# Patient Record
Sex: FEMALE | NOT HISPANIC OR LATINO | Employment: FULL TIME | URBAN - METROPOLITAN AREA
[De-identification: names, ages, dates, MRNs, and addresses within clinical notes are randomized per-mention and may not be internally consistent; named-entity substitution may affect disease eponyms.]

---

## 2019-01-17 ENCOUNTER — OFFICE VISIT (OUTPATIENT)
Dept: MEDICAL GROUP | Facility: PHYSICIAN GROUP | Age: 49
End: 2019-01-17
Payer: COMMERCIAL

## 2019-01-17 ENCOUNTER — APPOINTMENT (OUTPATIENT)
Dept: RADIOLOGY | Facility: IMAGING CENTER | Age: 49
End: 2019-01-17
Attending: FAMILY MEDICINE
Payer: COMMERCIAL

## 2019-01-17 ENCOUNTER — TELEPHONE (OUTPATIENT)
Dept: SCHEDULING | Facility: IMAGING CENTER | Age: 49
End: 2019-01-17

## 2019-01-17 VITALS
HEIGHT: 62 IN | HEART RATE: 60 BPM | TEMPERATURE: 98.4 F | BODY MASS INDEX: 29.63 KG/M2 | RESPIRATION RATE: 18 BRPM | WEIGHT: 161 LBS | SYSTOLIC BLOOD PRESSURE: 130 MMHG | OXYGEN SATURATION: 96 % | DIASTOLIC BLOOD PRESSURE: 82 MMHG

## 2019-01-17 DIAGNOSIS — M54.41 ACUTE RIGHT-SIDED LOW BACK PAIN WITH RIGHT-SIDED SCIATICA: ICD-10-CM

## 2019-01-17 DIAGNOSIS — Z13.6 SCREENING FOR CARDIOVASCULAR CONDITION: ICD-10-CM

## 2019-01-17 DIAGNOSIS — Z13.1 SCREENING FOR DIABETES MELLITUS: ICD-10-CM

## 2019-01-17 DIAGNOSIS — Z12.31 ENCOUNTER FOR SCREENING MAMMOGRAM FOR BREAST CANCER: ICD-10-CM

## 2019-01-17 PROCEDURE — 72100 X-RAY EXAM L-S SPINE 2/3 VWS: CPT | Mod: TC | Performed by: FAMILY MEDICINE

## 2019-01-17 PROCEDURE — 99203 OFFICE O/P NEW LOW 30 MIN: CPT | Performed by: FAMILY MEDICINE

## 2019-01-17 ASSESSMENT — PATIENT HEALTH QUESTIONNAIRE - PHQ9: CLINICAL INTERPRETATION OF PHQ2 SCORE: 0

## 2019-01-17 NOTE — PROGRESS NOTES
"cc: lower back pain.       Subjective:     Yoana Snow is a 48 y.o. female presenting for the following:     For the last 6 weeks patient has had some right sided lower back pain that is radiating down her posterior thigh and leg. No injuries or falls. No weakness or numbness. No changes to bladder/bowel.      She has been trying to stay active but her back pain has flared when she tried to go back to exercise. The pain is improved with massage and gentle stretching exercises.     Review of systems:  All others reviewed and are negative.     No current outpatient prescriptions on file.    Allergies, past medical history, past surgical history, family history, social history reviewed and updated    Objective:     Vitals: /82 (BP Location: Left arm, Patient Position: Sitting, BP Cuff Size: Adult)   Pulse 60   Temp 36.9 °C (98.4 °F) (Temporal)   Resp 18   Ht 1.575 m (5' 2\")   Wt 73 kg (161 lb)   SpO2 96%   BMI 29.45 kg/m²   General: Alert, pleasant, NAD  HEENT: Normocephalic.   EOMI, no icterus or pallor.  Conjunctivae and lids normal. External ears normal. mucous membranes moist.    Neck supple.  No thyromegaly or masses palpated. No cervical or supraclavicular lymphadenopathy.  Heart: Regular rate and rhythm.  S1 and S2 normal.  No murmurs appreciated.  Respiratory: Normal respiratory effort.  Clear to auscultation bilaterally.  Abdomen: Non-distended, soft  Skin: Warm, dry  Musculoskeletal: Gait is normal.  Moves all extremities well. Patellar DTRs 2+ bilaterally. Back without deformity or tenderness.   Extremities: No leg edema.    Neurological:  gait is normal, CN2-12 grossly intact  Psych:  Affect is normal, judgement is good, grooming is appropriate.    Assessment/Plan:     Yoana was seen today for annual exam.    Diagnoses and all orders for this visit:    Acute right-sided low back pain with right-sided sciatica: no red flag symptoms. Will treat with PT and patient to RTC if symptoms not " improving in 4-6 weeks. PRN NSAIDS and suggest staying active.   -     DX-LUMBAR SPINE-2 OR 3 VIEWS; Future  -     REFERRAL TO PHYSICAL THERAPY Reason for Therapy: Eval/Treat/Report    Screening for cardiovascular condition  -     Lipid Profile; Future    Screening for diabetes mellitus  -     BASIC METABOLIC PANEL; Future    Encounter for screening mammogram for breast cancer  No breast pain or masses, breast skin changes, or nipple discharge.   -     MA-SCREENING MAMMO BILAT W/TOMOSYNTHESIS W/CAD; Future    Return if symptoms worsen or fail to improve.

## 2019-01-29 ENCOUNTER — HOSPITAL ENCOUNTER (OUTPATIENT)
Dept: LAB | Facility: MEDICAL CENTER | Age: 49
End: 2019-01-29
Attending: OBSTETRICS & GYNECOLOGY
Payer: COMMERCIAL

## 2019-01-29 LAB
25(OH)D3 SERPL-MCNC: 19 NG/ML (ref 30–100)
ALBUMIN SERPL BCP-MCNC: 4.6 G/DL (ref 3.2–4.9)
ALBUMIN/GLOB SERPL: 1.8 G/DL
ALP SERPL-CCNC: 44 U/L (ref 30–99)
ALT SERPL-CCNC: 12 U/L (ref 2–50)
ANION GAP SERPL CALC-SCNC: 4 MMOL/L (ref 0–11.9)
AST SERPL-CCNC: 16 U/L (ref 12–45)
BASOPHILS # BLD AUTO: 0.6 % (ref 0–1.8)
BASOPHILS # BLD: 0.03 K/UL (ref 0–0.12)
BILIRUB SERPL-MCNC: 0.5 MG/DL (ref 0.1–1.5)
BUN SERPL-MCNC: 11 MG/DL (ref 8–22)
CALCIUM SERPL-MCNC: 9.8 MG/DL (ref 8.5–10.5)
CHLORIDE SERPL-SCNC: 105 MMOL/L (ref 96–112)
CHOLEST SERPL-MCNC: 143 MG/DL (ref 100–199)
CO2 SERPL-SCNC: 26 MMOL/L (ref 20–33)
CREAT SERPL-MCNC: 0.71 MG/DL (ref 0.5–1.4)
EOSINOPHIL # BLD AUTO: 0.1 K/UL (ref 0–0.51)
EOSINOPHIL NFR BLD: 2 % (ref 0–6.9)
ERYTHROCYTE [DISTWIDTH] IN BLOOD BY AUTOMATED COUNT: 43.4 FL (ref 35.9–50)
GLOBULIN SER CALC-MCNC: 2.6 G/DL (ref 1.9–3.5)
GLUCOSE SERPL-MCNC: 87 MG/DL (ref 65–99)
HCT VFR BLD AUTO: 45.7 % (ref 37–47)
HDLC SERPL-MCNC: 52 MG/DL
HGB BLD-MCNC: 14.9 G/DL (ref 12–16)
IMM GRANULOCYTES # BLD AUTO: 0.01 K/UL (ref 0–0.11)
IMM GRANULOCYTES NFR BLD AUTO: 0.2 % (ref 0–0.9)
LDLC SERPL CALC-MCNC: 82 MG/DL
LYMPHOCYTES # BLD AUTO: 1.86 K/UL (ref 1–4.8)
LYMPHOCYTES NFR BLD: 36.5 % (ref 22–41)
MCH RBC QN AUTO: 29.3 PG (ref 27–33)
MCHC RBC AUTO-ENTMCNC: 32.6 G/DL (ref 33.6–35)
MCV RBC AUTO: 89.8 FL (ref 81.4–97.8)
MONOCYTES # BLD AUTO: 0.32 K/UL (ref 0–0.85)
MONOCYTES NFR BLD AUTO: 6.3 % (ref 0–13.4)
NEUTROPHILS # BLD AUTO: 2.78 K/UL (ref 2–7.15)
NEUTROPHILS NFR BLD: 54.4 % (ref 44–72)
NRBC # BLD AUTO: 0 K/UL
NRBC BLD-RTO: 0 /100 WBC
PLATELET # BLD AUTO: 317 K/UL (ref 164–446)
PMV BLD AUTO: 9.7 FL (ref 9–12.9)
POTASSIUM SERPL-SCNC: 3.9 MMOL/L (ref 3.6–5.5)
PROT SERPL-MCNC: 7.2 G/DL (ref 6–8.2)
RBC # BLD AUTO: 5.09 M/UL (ref 4.2–5.4)
SODIUM SERPL-SCNC: 135 MMOL/L (ref 135–145)
TRIGL SERPL-MCNC: 44 MG/DL (ref 0–149)
WBC # BLD AUTO: 5.1 K/UL (ref 4.8–10.8)

## 2019-01-29 PROCEDURE — 80061 LIPID PANEL: CPT

## 2019-01-29 PROCEDURE — 82306 VITAMIN D 25 HYDROXY: CPT

## 2019-01-29 PROCEDURE — 85025 COMPLETE CBC W/AUTO DIFF WBC: CPT

## 2019-01-29 PROCEDURE — 80053 COMPREHEN METABOLIC PANEL: CPT

## 2019-02-14 ENCOUNTER — PHYSICAL THERAPY (OUTPATIENT)
Dept: PHYSICAL THERAPY | Facility: REHABILITATION | Age: 49
End: 2019-02-14
Attending: FAMILY MEDICINE
Payer: COMMERCIAL

## 2019-02-14 DIAGNOSIS — M54.41 ACUTE RIGHT-SIDED LOW BACK PAIN WITH RIGHT-SIDED SCIATICA: ICD-10-CM

## 2019-02-14 PROCEDURE — 97014 ELECTRIC STIMULATION THERAPY: CPT

## 2019-02-14 PROCEDURE — 97012 MECHANICAL TRACTION THERAPY: CPT

## 2019-02-14 PROCEDURE — 97161 PT EVAL LOW COMPLEX 20 MIN: CPT

## 2019-02-14 SDOH — ECONOMIC STABILITY: GENERAL: QUALITY OF LIFE: EXCELLENT

## 2019-02-14 ASSESSMENT — ENCOUNTER SYMPTOMS
QUALITY: SHARP
EXACERBATED BY: PROLONGED SITTING
EXACERBATED BY: STAIR CLIMBING
PAIN TIMING: IN THE EVENING
EXACERBATED BY: SNEEZING
QUALITY: THROBBING
PAIN SCALE: 1
EXACERBATED BY: BENDING
ALLEVIATING FACTORS: CHANGE IN POSITION
PAIN SCALE AT HIGHEST: 5
EXACERBATED BY: COUGHING
QUALITY: TINGLING
PAIN SCALE AT LOWEST: 0
ALLEVIATING FACTORS: ICE
EXACERBATED BY: SITTING
ALLEVIATING FACTORS: MASSAGE

## 2019-02-14 ASSESSMENT — ACTIVITIES OF DAILY LIVING (ADL): POOR_BALANCE: 1

## 2019-02-14 NOTE — OP THERAPY EVALUATION
Outpatient Physical Therapy  INITIAL EVALUATION    [facility] Physical Therapy [address]  [address]  [place] [address]  Phone:  [phone]  Fax:  [phone]    Date of Evaluation: 2019    Patient: Yoana Snow  YOB: 1970  MRN: [id number]     Referring Provider: Britany Cohen M.D.  [address] [facility] [address]  [place] [address]   Referring Diagnosis Acute right-sided low back pain with right-sided sciatica [M54.41]     Time Calculation  Start time: 1000  Stop time: 1115 Time Calculation (min): 75 minutes     Physical Therapy Occurrence Codes    Date of onset of impairment:  19   Date physical therapy care plan established or reviewed:  19   Date physical therapy treatment started:  19          Chief Complaint: Back Problem    Visit Diagnoses     ICD-10-CM   1. Acute right-sided low back pain with right-sided sciatica M54.41         Subjective:   History of Present Illness:     Mechanism of injury:  Patient reports onset of R side low back pain in late November. Initially only in the low back, and about 3 weeks later started radiating down RLE posteriorly to ankle. Since then muscles in low back and posterior RLE have felt quite tense. About a week and a half ago, began having and numbness and tingling down posterior RLE in the same pattern.  Pain level has reduced as the numbness/tingling began.  No fevers. Denies changes to bowel/bladder function.      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, sitting primarily  Sleep disturbance:  Not disrupted (pain if she wakes up to shift positions, but falls right back to sleep)  Pain:     Current pain ratin    At best pain ratin (when she ices)    At worst pain ratin    Location:  Low back R>L and posterior RLE to ankle    Quality:  Tingling, sharp and throbbing    Pain timing:  In the evening (pain is lowest in the morning upon waking)     "Relieving factors:  Ice, massage and change in position (\"sitting up straight\")    Aggravating factors:  Stair climbing, sitting, bending, prolonged sitting, sneezing and coughing (putting on shoes)    Progression:  Improving  Social Support:     Lives in:  One-story house    Lives with:  Young children and spouse  Diagnostic Tests:     X-ray: normal      Diagnostic Tests Comments:  Lumbar spine x-ray 1/17/2019  Impression       1.  Minimal upper lumbar scoliosis versus spasm.    2.  Otherwise negative lumbar spine series.      Treatments:     Previous treatment:  Chiropractic and massage  Activities of Daily Living:     Patient reported ADL status: Prior to onset, was sporadically exercising  Patient Goals:     Patient goals for therapy:  Return to sport/leisure activities, increased motion and independence with ADLs/IADLs    Other patient goals:  To be able to put her shoes on and get rid of the pain in her leg      No past medical history on file.  No past surgical history on file.  Social History   Substance Use Topics   • Smoking status: Never Smoker   • Smokeless tobacco: Never Used   • Alcohol use No     Family and Occupational History     Social History   • Marital status: Single     Spouse name: N/A   • Number of children: N/A   • Years of education: N/A       Objective     Neurological Testing     Reflexes   Left   Patellar (L4): normal (2+)  Achilles (S1): normal (2+)  Ankle clonus reflex: negative  Babinski sign: negative    Right   Patellar (L4): normal (2+)  Achilles (S1): normal (2+)  Ankle clonus reflex: negative  Babinski sign: negative    Myotome testing   Lumbar (left)   All left lumbar myotomes within normal limits    Lumbar (right)   All right lumbar myotomes within normal limits    Dermatome testing   Lumbar (left)   All left lumbar dermatomes intact    Lumbar (right)   All right lumbar dermatomes intact    Palpation     Additional Palpation Details  No tenderness to palpation lumbar " "paraspinals, QL, glute med, piriformis bilaterally    Tenderness     Left Hip   No tenderness in the sacroiliac joint.     Right Hip   No tenderness in the sacroiliac joint.     Active Range of Motion     Lumbar   Flexion: decreased (Fingers to knees. At baseline could put hands on floor per patient. LBP and calf pain R.)  Extension: within functional limits (mild calf pain R)  Left lateral flexion: within functional limits (\"pull\" in R glute region)  Right lateral flexion: decreased (slightly decreased. asymptomatic.)  Left rotation: within functional limits  Right rotation: within functional limits    Joint Play   Spine     Unilateral PA Glide (left)        T12: WFL       L1: WFL       L2: WFL       L3: WFL       L4: WFL       L5: WFL       S1: WFL        Tests       Lumbar spine (right)     Negative slump.     Left Hip   Negative SI compression.   SLR: Negative.     Right Hip   Positive SI compression.   SLR: Negative.     Additional Tests Details  Slump and SLR R negative for radiating symptoms but positive for neural tension and hamstring/gastroc muscle spasm        Therapeutic Exercises (CPT 54689):     1. Neuromeningeal glides in supine or standing, patient reported relief of symptoms    2. Manual traction, 3 min, patient reported relief of symptoms    Therapeutic Treatments and Modalities:     1. Mechanical Traction (CPT 22530), Lumbar 70#/40# 60/20 w/mhp 15'    Time-based treatments/modalities:  Therapeutic exercise minutes (CPT 43241): 8 minutes       Assessment, Response and Plan:   Impairments: abnormal gait, abnormal muscle firing, abnormal or restricted ROM, impaired balance, lacks appropriate home exercise program, limited ADL's and pain with function    Assessment details:  48 year old female with chief complaint of tightness and n/t in posterior R LE following 2-3 month onset of R side LBP with posterior RLE radiating pain.  Exam findings show impairments of ROM, gait, and function as above, " secondary to probable lumbar radiculopathy. The patient will benefit from skilled PT to address associated impairments and functional limitations, to return her to baseline and improve QOL.   Barriers to therapy:  None  Prognosis: good    Goals:   Short Term Goals:   Lumbar AROM without provocation of symptoms  No symptom provocation with ADLs/IADLS  Short term goal time span:  2-4 weeks      Long Term Goals:    Remy Jaren Score = 0  Patient is independent with HEP  No symptoms with slump or SLR R  Long term goal time span:  4-6 weeks    Plan:   Therapy options:  Physical therapy treatment to continue  Planned therapy interventions:  E Stim Unattended (CPT 98912), Manual Therapy (CPT 35824), Neuromuscular Re-education (CPT 21425), Mechanical Traction (CPT 01807), Therapeutic Activities (CPT 52553) and Therapeutic Exercise (CPT 17305)  Frequency:  2x week  Duration in weeks:  6  Discussed with:  Patient      Functional Limitations and Severity Modifiers  Remy Jaren Low Back Pain and Disability Score: 20.83     Referring provider co-signature:  I have reviewed this plan of care and my co-signature certifies the need for services.  Certification Dates:   From 2/14/2019    To 3/29/2019    Physician Signature: ________________________________ Date: ______________

## 2019-02-18 ENCOUNTER — APPOINTMENT (OUTPATIENT)
Dept: PHYSICAL THERAPY | Facility: REHABILITATION | Age: 49
End: 2019-02-18
Payer: COMMERCIAL

## 2019-02-20 ENCOUNTER — PHYSICAL THERAPY (OUTPATIENT)
Dept: PHYSICAL THERAPY | Facility: REHABILITATION | Age: 49
End: 2019-02-20
Attending: FAMILY MEDICINE
Payer: COMMERCIAL

## 2019-02-20 DIAGNOSIS — M54.41 ACUTE RIGHT-SIDED LOW BACK PAIN WITH RIGHT-SIDED SCIATICA: ICD-10-CM

## 2019-02-20 PROCEDURE — 97110 THERAPEUTIC EXERCISES: CPT

## 2019-02-20 PROCEDURE — 97012 MECHANICAL TRACTION THERAPY: CPT

## 2019-02-20 NOTE — OP THERAPY DAILY TREATMENT
Outpatient Physical Therapy  DAILY TREATMENT     [facility] Physical [address].  [address]  [place] [address]  Phone:  [phone]  Fax:  [phone]    Date: 02/20/2019    Patient: Yoana Snow  YOB: 1970  MRN: [id number]     Time Calculation  Start time: 1100  Stop time: 1145 Time Calculation (min): 45 minutes     Chief Complaint: Back Problem    Visit #: 2    SUBJECTIVE:  Was symptom free for three days after initial evaluation. But, then felt a tweak in RLB when getting out of bed and radiating numbness returned. Not as bad as previously. Provoked with bending over primarily.     OBJECTIVE:  Current objective measures:           Therapeutic Exercises (CPT 28842):     1. Nustepper , L3 4 min    2. ADIM with heel walk, knee fall out    3. Bilateral UE extension with pink band, 15 x 2, HEP    4. Bridging, 8 x 2, HEP    5. Neuromeningeal glides      Therapeutic Exercise Summary: Access Code: [email or web address]   URL: [email or web address]   Date: 02/20/2019   Prepared by: Yun Ku      Exercises  · Supine Bridge - 8 reps - 2 sets - 1x daily - 7x weekly  · Supine Transversus Abdominis Bracing - Hands on Thighs - 15 reps - 2 sets - 1x daily - 7x weekly    Neuromeningeal glides     Therapeutic Treatments and Modalities:     1. Mechanical Traction (CPT 60305), Lumbar 70#/40# 60/20 w/mhp 15'    Time-based treatments/modalities:  Therapeutic exercise minutes (CPT 47235): 28 minutes           ASSESSMENT:   Response to treatment: Difficulty with ADIM improved with band exercise. No symptom provocation with today's treatment, relief with traction.     PLAN/RECOMMENDATIONS:   Plan for treatment: therapy treatment to continue next visit.  Planned interventions for next visit: continue with current treatment.

## 2019-02-21 ENCOUNTER — APPOINTMENT (OUTPATIENT)
Dept: PHYSICAL THERAPY | Facility: REHABILITATION | Age: 49
End: 2019-02-21
Attending: FAMILY MEDICINE
Payer: COMMERCIAL

## 2019-02-25 ENCOUNTER — APPOINTMENT (OUTPATIENT)
Dept: PHYSICAL THERAPY | Facility: REHABILITATION | Age: 49
End: 2019-02-25
Payer: COMMERCIAL

## 2019-02-25 ENCOUNTER — PHYSICAL THERAPY (OUTPATIENT)
Dept: PHYSICAL THERAPY | Facility: REHABILITATION | Age: 49
End: 2019-02-25
Attending: FAMILY MEDICINE
Payer: COMMERCIAL

## 2019-02-25 DIAGNOSIS — M54.41 ACUTE RIGHT-SIDED LOW BACK PAIN WITH RIGHT-SIDED SCIATICA: ICD-10-CM

## 2019-02-25 PROCEDURE — 97012 MECHANICAL TRACTION THERAPY: CPT

## 2019-02-25 PROCEDURE — 97110 THERAPEUTIC EXERCISES: CPT

## 2019-02-25 NOTE — OP THERAPY DAILY TREATMENT
Outpatient Physical Therapy  DAILY TREATMENT     [facility] Physical [address]  [address]  [place] [address]  Phone:  [phone]  Fax:  [phone]    Date: 02/25/2019    Patient: Yoana Snow  YOB: 1970  MRN: [id number]     Time Calculation  Start time: 0945 (the patient arrived 15 min late)  Stop time: 1015 Time Calculation (min): 30 minutes     Chief Complaint: Back Problem    Visit #: 3    SUBJECTIVE:  Having less trouble putting on shoes, climbing stairs.     OBJECTIVE:  Current objective measures:           Therapeutic Exercises (CPT 51001):     1. Neuromeningeal glides L, 1 min x 2    2. Supine 90/90 with tap downs/bicycle, 30 sec. x 2    3. Cat/camel, 10 reps x 2    4. Quadruped unilateral hip extension to neutral, 3 with 15 sec. hold ea. side x 1      Therapeutic Exercise Summary: HEP - Access Code: [email or web address]   URL: [email or web address]   Date: 02/25/2019   Prepared by: Yun Ku      Exercises  · Supine Bridge - 8 reps - 2 sets - 1x daily - 7x weekly  · Quadruped Alternating Leg Extensions - 10 reps - 3 sets - 1x daily - 7x weekly  · Supine Core Bicycle - 10 reps - 3 sets - 1x daily - 7x weekly  · Cat-Camel - 10 reps - 3 sets - 1x daily - 7x weekly        Neuromeningeal glides     Therapeutic Treatments and Modalities:     1. Mechanical Traction (CPT 54118), Lumbar 80#/40# 60/20 w/mhp 15'    Time-based treatments/modalities:  Therapeutic exercise minutes (CPT 64210): 15 minutes         ASSESSMENT:   Response to treatment: Steady reduction in frequency/irritability of symptoms. Difficulty engaging L glute appropriately in quadruped exercise, improved with cueing.     PLAN/RECOMMENDATIONS:   Plan for treatment: therapy treatment to continue next visit.  Planned interventions for next visit: continue with current treatment. Follow up in one week. Squats, BOSU, assess L glute strength.

## 2019-02-26 ENCOUNTER — APPOINTMENT (OUTPATIENT)
Dept: PHYSICAL THERAPY | Facility: REHABILITATION | Age: 49
End: 2019-02-26
Attending: FAMILY MEDICINE
Payer: COMMERCIAL

## 2019-02-27 ENCOUNTER — APPOINTMENT (OUTPATIENT)
Dept: PHYSICAL THERAPY | Facility: REHABILITATION | Age: 49
End: 2019-02-27
Attending: FAMILY MEDICINE
Payer: COMMERCIAL

## 2019-02-27 NOTE — OP THERAPY DAILY TREATMENT
Outpatient Physical Therapy  DAILY TREATMENT     [facility] Physical Therapy [address].  [address]  [place] [address]  Phone:  [phone]  Fax:  [phone]    Date: 02/27/2019    Patient: Yoana Snow  YOB: 1970  MRN: [id number]     Time Calculation             Chief Complaint: No chief complaint on file.    Visit #: 4    SUBJECTIVE:      OBJECTIVE:  Current objective measures:           Therapeutic Exercises (CPT 13728):     1. Neuromeningeal glides L, 1 min x 2    2. Supine 90/90 with tap downs/bicycle, 30 sec. x 2    3. Cat/camel, 10 reps x 2    4. Quadruped unilateral hip extension to neutral, 3 with 15 sec. hold ea. side x 1      Therapeutic Exercise Summary: HEP - Access Code: [email or web address]   URL: [email or web address]   Date: 02/25/2019   Prepared by: Yun Ku      Exercises  · Supine Bridge - 8 reps - 2 sets - 1x daily - 7x weekly  · Quadruped Alternating Leg Extensions - 10 reps - 3 sets - 1x daily - 7x weekly  · Supine Core Bicycle - 10 reps - 3 sets - 1x daily - 7x weekly  · Cat-Camel - 10 reps - 3 sets - 1x daily - 7x weekly        Neuromeningeal glides     Therapeutic Treatments and Modalities:     1. Mechanical Traction (CPT 27668), Lumbar 80#/40# 60/20 w/mhp 15'    Time-based treatments/modalities:              ASSESSMENT:   Response to treatment: ***    PLAN/RECOMMENDATIONS:   Plan for treatment: therapy treatment to continue next visit.  Planned interventions for next visit: continue with current treatment.

## 2019-02-28 ENCOUNTER — APPOINTMENT (OUTPATIENT)
Dept: PHYSICAL THERAPY | Facility: REHABILITATION | Age: 49
End: 2019-02-28
Attending: FAMILY MEDICINE
Payer: COMMERCIAL

## 2019-03-05 ENCOUNTER — APPOINTMENT (OUTPATIENT)
Dept: PHYSICAL THERAPY | Facility: REHABILITATION | Age: 49
End: 2019-03-05
Attending: FAMILY MEDICINE
Payer: COMMERCIAL

## 2019-03-07 ENCOUNTER — APPOINTMENT (OUTPATIENT)
Dept: PHYSICAL THERAPY | Facility: REHABILITATION | Age: 49
End: 2019-03-07
Attending: FAMILY MEDICINE
Payer: COMMERCIAL

## 2019-03-11 ENCOUNTER — APPOINTMENT (OUTPATIENT)
Dept: PHYSICAL THERAPY | Facility: REHABILITATION | Age: 49
End: 2019-03-11
Attending: FAMILY MEDICINE
Payer: COMMERCIAL

## 2019-03-12 ENCOUNTER — APPOINTMENT (OUTPATIENT)
Dept: PHYSICAL THERAPY | Facility: REHABILITATION | Age: 49
End: 2019-03-12
Attending: FAMILY MEDICINE
Payer: COMMERCIAL

## 2019-03-13 ENCOUNTER — APPOINTMENT (OUTPATIENT)
Dept: PHYSICAL THERAPY | Facility: REHABILITATION | Age: 49
End: 2019-03-13
Attending: FAMILY MEDICINE
Payer: COMMERCIAL

## 2019-03-14 ENCOUNTER — APPOINTMENT (OUTPATIENT)
Dept: PHYSICAL THERAPY | Facility: REHABILITATION | Age: 49
End: 2019-03-14
Attending: FAMILY MEDICINE
Payer: COMMERCIAL

## 2019-03-15 ENCOUNTER — TELEPHONE (OUTPATIENT)
Dept: PHYSICAL THERAPY | Facility: REHABILITATION | Age: 49
End: 2019-03-15

## 2019-03-15 NOTE — OP THERAPY DISCHARGE SUMMARY
Outpatient Physical Therapy  DISCHARGE SUMMARY NOTE      [address].  [address]  [place] [address]  Phone:  [phone]  Fax:  [phone]    Date of Visit: 03/15/2019    Patient: Yoana Snow  YOB: 1970  MRN: [id number]     Referring Provider: Britany Cohen M.D.   Referring Diagnosis  Acute right-sided low back pain with right-sided sciatica [M54.41          Physical Therapy Occurrence Codes    Date of onset of impairment:  1/17/19   Date physical therapy care plan established or reviewed:  2/14/19   Date physical therapy treatment started:  2/14/19              Your patient is being discharged from Physical Therapy with the following comments:   · Patient has failed to schedule or reschedule follow-up visits    Comments:  The patient was evaluated in PT on 2/14/2019 and attended two follow up appointments. Last seen for PT on 2/25/2019. The patient showed progress towards goals with reduced frequency and irritability of symptoms. She called our office to self-discharge.     Limitations Remaining:  ?    Recommendations:  Return to PT with new order for evaluation if needed.     Yun Ku, PT    Date: 3/15/2019

## 2019-08-28 ENCOUNTER — HOSPITAL ENCOUNTER (OUTPATIENT)
Dept: RADIOLOGY | Facility: MEDICAL CENTER | Age: 49
End: 2019-08-28

## 2019-08-28 ENCOUNTER — HOSPITAL ENCOUNTER (OUTPATIENT)
Dept: RADIOLOGY | Facility: MEDICAL CENTER | Age: 49
End: 2019-08-28
Attending: FAMILY MEDICINE
Payer: COMMERCIAL

## 2019-08-28 DIAGNOSIS — Z12.31 ENCOUNTER FOR SCREENING MAMMOGRAM FOR BREAST CANCER: ICD-10-CM

## 2019-08-28 PROCEDURE — 77063 BREAST TOMOSYNTHESIS BI: CPT

## 2019-09-03 ENCOUNTER — HOSPITAL ENCOUNTER (OUTPATIENT)
Dept: RADIOLOGY | Facility: MEDICAL CENTER | Age: 49
End: 2019-09-03
Attending: OBSTETRICS & GYNECOLOGY
Payer: COMMERCIAL

## 2019-09-03 DIAGNOSIS — R92.8 ABNORMAL MAMMOGRAM: ICD-10-CM

## 2019-09-03 PROCEDURE — G0279 TOMOSYNTHESIS, MAMMO: HCPCS

## 2019-09-03 PROCEDURE — 76642 ULTRASOUND BREAST LIMITED: CPT | Mod: RT

## 2023-08-14 ENCOUNTER — HOSPITAL ENCOUNTER (OUTPATIENT)
Dept: LAB | Facility: MEDICAL CENTER | Age: 53
End: 2023-08-14
Attending: OBSTETRICS & GYNECOLOGY
Payer: COMMERCIAL

## 2023-08-14 LAB
ALBUMIN SERPL BCP-MCNC: 4.6 G/DL (ref 3.2–4.9)
ALBUMIN/GLOB SERPL: 1.9 G/DL
ALP SERPL-CCNC: 59 U/L (ref 30–99)
ALT SERPL-CCNC: 9 U/L (ref 2–50)
ANION GAP SERPL CALC-SCNC: 9 MMOL/L (ref 7–16)
AST SERPL-CCNC: 15 U/L (ref 12–45)
BASOPHILS # BLD AUTO: 0.6 % (ref 0–1.8)
BASOPHILS # BLD: 0.03 K/UL (ref 0–0.12)
BILIRUB SERPL-MCNC: 0.8 MG/DL (ref 0.1–1.5)
BUN SERPL-MCNC: 10 MG/DL (ref 8–22)
CALCIUM ALBUM COR SERPL-MCNC: 8.8 MG/DL (ref 8.5–10.5)
CALCIUM SERPL-MCNC: 9.3 MG/DL (ref 8.5–10.5)
CHLORIDE SERPL-SCNC: 102 MMOL/L (ref 96–112)
CHOLEST SERPL-MCNC: 156 MG/DL (ref 100–199)
CO2 SERPL-SCNC: 27 MMOL/L (ref 20–33)
CREAT SERPL-MCNC: 0.6 MG/DL (ref 0.5–1.4)
EOSINOPHIL # BLD AUTO: 0.11 K/UL (ref 0–0.51)
EOSINOPHIL NFR BLD: 2.2 % (ref 0–6.9)
ERYTHROCYTE [DISTWIDTH] IN BLOOD BY AUTOMATED COUNT: 42.7 FL (ref 35.9–50)
FASTING STATUS PATIENT QL REPORTED: NORMAL
GFR SERPLBLD CREATININE-BSD FMLA CKD-EPI: 108 ML/MIN/1.73 M 2
GLOBULIN SER CALC-MCNC: 2.4 G/DL (ref 1.9–3.5)
GLUCOSE SERPL-MCNC: 91 MG/DL (ref 65–99)
HCT VFR BLD AUTO: 46.2 % (ref 37–47)
HDLC SERPL-MCNC: 59 MG/DL
HGB BLD-MCNC: 14.9 G/DL (ref 12–16)
IMM GRANULOCYTES # BLD AUTO: 0.02 K/UL (ref 0–0.11)
IMM GRANULOCYTES NFR BLD AUTO: 0.4 % (ref 0–0.9)
LDLC SERPL CALC-MCNC: 84 MG/DL
LYMPHOCYTES # BLD AUTO: 1.77 K/UL (ref 1–4.8)
LYMPHOCYTES NFR BLD: 35.3 % (ref 22–41)
MCH RBC QN AUTO: 29.6 PG (ref 27–33)
MCHC RBC AUTO-ENTMCNC: 32.3 G/DL (ref 32.2–35.5)
MCV RBC AUTO: 91.7 FL (ref 81.4–97.8)
MONOCYTES # BLD AUTO: 0.34 K/UL (ref 0–0.85)
MONOCYTES NFR BLD AUTO: 6.8 % (ref 0–13.4)
NEUTROPHILS # BLD AUTO: 2.74 K/UL (ref 1.82–7.42)
NEUTROPHILS NFR BLD: 54.7 % (ref 44–72)
NRBC # BLD AUTO: 0 K/UL
NRBC BLD-RTO: 0 /100 WBC (ref 0–0.2)
PLATELET # BLD AUTO: 349 K/UL (ref 164–446)
PMV BLD AUTO: 9.6 FL (ref 9–12.9)
POTASSIUM SERPL-SCNC: 4.6 MMOL/L (ref 3.6–5.5)
PROT SERPL-MCNC: 7 G/DL (ref 6–8.2)
RBC # BLD AUTO: 5.04 M/UL (ref 4.2–5.4)
SODIUM SERPL-SCNC: 138 MMOL/L (ref 135–145)
TRIGL SERPL-MCNC: 66 MG/DL (ref 0–149)
TSH SERPL DL<=0.005 MIU/L-ACNC: 3.29 UIU/ML (ref 0.38–5.33)
WBC # BLD AUTO: 5 K/UL (ref 4.8–10.8)

## 2023-08-14 PROCEDURE — 80061 LIPID PANEL: CPT

## 2023-08-14 PROCEDURE — 36415 COLL VENOUS BLD VENIPUNCTURE: CPT

## 2023-08-14 PROCEDURE — 85025 COMPLETE CBC W/AUTO DIFF WBC: CPT

## 2023-08-14 PROCEDURE — 80053 COMPREHEN METABOLIC PANEL: CPT

## 2023-08-14 PROCEDURE — 84443 ASSAY THYROID STIM HORMONE: CPT
